# Patient Record
Sex: FEMALE | Race: WHITE | URBAN - METROPOLITAN AREA
[De-identification: names, ages, dates, MRNs, and addresses within clinical notes are randomized per-mention and may not be internally consistent; named-entity substitution may affect disease eponyms.]

---

## 2023-10-09 ENCOUNTER — TELEPHONE (OUTPATIENT)
Dept: SURGERY | Facility: HOSPITAL | Age: 63
End: 2023-10-09

## 2023-10-09 NOTE — TELEPHONE ENCOUNTER
Just a brief update -- was hospitalized Friday, experienced very erratic heart rate that lasted all week. I thought it might be related to a cold earlier in the week.    Was dehydrated and kidney function was off due to diarrhea-- during the work up they did a CT scan and saw blood clots in both lungs and they admitted me immediately     Kidney issues are resolved and will be placed on blood thinners     Oncology team is investigating potential side effect from Verzenio which I went back on 5 weeks ago    They are also aware of recent GI surgery-- but I was on blood thinners post surgery and have been very physically active     They've suspended the Verzenio     But I'm on the mend and looking forward to having the ileostomy take down surgery -- hopefully ?? at the end of the month - it has not been scheduled     Carmen Dhaliwal